# Patient Record
Sex: FEMALE | Employment: OTHER | URBAN - METROPOLITAN AREA
[De-identification: names, ages, dates, MRNs, and addresses within clinical notes are randomized per-mention and may not be internally consistent; named-entity substitution may affect disease eponyms.]

---

## 2017-02-03 ENCOUNTER — OFFICE VISIT (OUTPATIENT)
Dept: SURGERY | Age: 67
End: 2017-02-03

## 2017-02-03 VITALS
HEIGHT: 64 IN | WEIGHT: 172.5 LBS | HEART RATE: 87 BPM | DIASTOLIC BLOOD PRESSURE: 95 MMHG | OXYGEN SATURATION: 97 % | SYSTOLIC BLOOD PRESSURE: 142 MMHG | RESPIRATION RATE: 20 BRPM | BODY MASS INDEX: 29.45 KG/M2

## 2017-02-03 DIAGNOSIS — R22.31 AXILLARY MASS, RIGHT: Primary | ICD-10-CM

## 2017-02-03 DIAGNOSIS — N63.10 BREAST MASS, RIGHT: ICD-10-CM

## 2017-02-03 NOTE — PROGRESS NOTES
HISTORY OF PRESENT ILLNESS  Diana Spence is a 77 y.o. female who comes in for consultation by Dr Deana Sanchez for an axillary mass  HPI  She has been having increasing right axillary swelling and discomfort for several months. She has had shoulder injections without relief. 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 She had a mammogram in Aug 2016 which was ok. She has had bilateral breast biopsies in the past.  She has some chronic tenderness in both breasts. She denies trauma, skin changes, nipple changes or drainage, unexplained weight loss, fever, chills, or sweats or fatigue. She had menarche at 15, only pregnancy at 16, menopause at 48 and did not take HRT. Her mother had ovarian cancer at 67 ( quickly) and her niece had breast cancer at 22. Past Medical History   Diagnosis Date    Benign cyst of right breast     Hyperlipaemia     Sleep apnea      Past Surgical History   Procedure Laterality Date    Pr destruction of tumor, breast      Pr exc tumor soft tissue neck/ant thorax subfascial <5cm       Family History   Problem Relation Age of Onset    Cancer Mother      ovarian    Hypertension Mother     Cancer Other      breast     Social History   Substance Use Topics    Smoking status: Never Smoker    Smokeless tobacco: None    Alcohol use No     Current Outpatient Prescriptions   Medication Sig    cetirizine (ZYRTEC) 10 mg tablet Take  by mouth as needed for Allergies.  simvastatin (ZOCOR) 40 mg tablet Take  by mouth nightly. No current facility-administered medications for this visit. Allergies   Allergen Reactions    Pcn [Penicillins] Hives       Review of Systems   Constitutional: Negative for chills, diaphoresis, fever, malaise/fatigue and weight loss. HENT: Negative for congestion, ear pain and sore throat. Eyes: Negative for blurred vision and pain.    Respiratory: Negative for cough, hemoptysis, sputum production, shortness of breath, wheezing and stridor. Cardiovascular: Negative for chest pain, palpitations, orthopnea, claudication, leg swelling and PND. Gastrointestinal: Negative for abdominal pain, blood in stool, constipation, diarrhea, heartburn, melena, nausea and vomiting. Genitourinary: Negative for dysuria, flank pain, frequency, hematuria and urgency. Musculoskeletal: Positive for myalgias. Negative for back pain, joint pain and neck pain. Skin: Negative for itching and rash. Neurological: Negative for dizziness, tremors, focal weakness, seizures, weakness and headaches. Endo/Heme/Allergies: Negative for polydipsia. Psychiatric/Behavioral: Negative for depression and memory loss. The patient is not nervous/anxious. Visit Vitals    BP (!) 142/95 (BP 1 Location: Left arm, BP Patient Position: Sitting)    Pulse 87    Resp 20    Ht 5' 4\" (1.626 m)    Wt 78.2 kg (172 lb 8 oz)    SpO2 97%    BMI 29.61 kg/m2       Physical Exam   Constitutional: She is oriented to person, place, and time. She appears well-developed and well-nourished. No distress. HENT:   Head: Normocephalic and atraumatic. Mouth/Throat: Oropharynx is clear and moist. No oropharyngeal exudate. Eyes: Conjunctivae and EOM are normal. Pupils are equal, round, and reactive to light. No scleral icterus. Neck: Normal range of motion. Neck supple. No JVD present. No tracheal deviation present. No thyromegaly present. Cardiovascular: Normal rate and regular rhythm. Exam reveals no gallop and no friction rub. No murmur heard. Pulmonary/Chest: Effort normal and breath sounds normal. No respiratory distress. She has no wheezes. She has no rales. Right breast exhibits mass (larrge 8+ cm axillary tail fullness, soft not gritty) and tenderness. Right breast exhibits no inverted nipple and no skin change. Left breast exhibits tenderness. Left breast exhibits no inverted nipple, no mass and no skin change.  Breasts are asymmetrical (large ptotic). Abdominal: Soft. Bowel sounds are normal. She exhibits no distension and no mass. There is no tenderness. There is no rebound and no guarding. Musculoskeletal: Normal range of motion. She exhibits no edema. Lymphadenopathy:     She has no cervical adenopathy. She has no axillary adenopathy. Right: No supraclavicular adenopathy present. Left: No supraclavicular adenopathy present. Neurological: She is alert and oriented to person, place, and time. No cranial nerve deficit. Skin: Skin is warm and dry. No rash noted. She is not diaphoretic. No erythema. No pallor. Psychiatric: She has a normal mood and affect. Her behavior is normal. Judgment and thought content normal.       ASSESSMENT and PLAN  1. Right axillary tail mass ? lipoma. Does not feel like typical malignancy and is not well defined but there is certainly some asymmetry compared to the left. I explained to her about the anatomy and pathophysiology of the breast masses and risks for malignancy but likely this is benign. Will start with radiological work up  Right dx mammogram  Right breast US  Call with mammo/US results.     If negative, would proceed with breast MRI and MyRisk testing due to family hx (niece breast ca at 22, mother ovarian cancer     Miles Rodriguez MD FACS

## 2017-02-03 NOTE — MR AVS SNAPSHOT
Visit Information Date & Time Provider Department Dept. Phone Encounter #  
 2/3/2017 12:20 PM Cornelius Pollard MD [facility] [place] [id number] Upcoming Health Maintenance Date Due Hepatitis C Screening 1950 DTaP/Tdap/Td series (1 - Tdap) 6/30/1971 FOBT Q 1 YEAR AGE 50-75 6/30/2000 ZOSTER VACCINE AGE 60> 6/30/2010 GLAUCOMA SCREENING Q2Y 6/30/2015 OSTEOPOROSIS SCREENING (DEXA) 6/30/2015 Pneumococcal 65+ Low/Medium Risk (1 of 2 - PCV13) 6/30/2015 MEDICARE YEARLY EXAM 6/30/2015 INFLUENZA AGE 9 TO ADULT 8/1/2016 BREAST CANCER SCRN MAMMOGRAM 8/8/2018 Allergies as of 2/3/2017  Review Complete On: 2/3/2017 By: Cornelius Pollard MD  
  
 Severity Noted Reaction Type Reactions Pcn [Penicillins]  04/18/2010   Side Effect Hives Current Immunizations  Never Reviewed No immunizations on file. Not reviewed this visit You Were Diagnosed With   
  
 Codes Comments Axillary mass, right    -  Primary ICD-10-CM: R22.31 
ICD-9-CM: 782.2 Breast mass, right     ICD-10-CM: N63 
ICD-9-CM: 611.72 Vitals BP Pulse Resp Height(growth percentile) Weight(growth percentile) SpO2  
 (!) 142/95 (BP 1 Location: Left arm, BP Patient Position: Sitting) 87 20 5' 4\" (1.626 m) 172 lb 8 oz (78.2 kg) 97% BMI OB Status Smoking Status 29.61 kg/m2 Postmenopausal Never Smoker BMI and BSA Data Body Mass Index Body Surface Area  
 29.61 kg/m 2 1.88 m 2 Your Updated Medication List  
  
   
This list is accurate as of: 2/3/17  4:06 PM.  Always use your most recent med list.  
  
  
  
  
 cetirizine 10 mg tablet Commonly known as:  ZYRTEC Take  by mouth as needed for Allergies. simvastatin 40 mg tablet Commonly known as:  ZOCOR Take  by mouth nightly. To-Do List   
 02/03/2017 Imaging:  [facility] 3D EBONIE W MAMMO RT DX INCL CAD   
  
 02/03/2017 Imaging:  US BREAST RT LIMITED=<3 QUAD   
  
 02/10/2017 8:30 AM  
  Appointment with [facility] 2 at [address] [place] [address] ([phone]) Shower or bathe using soap and water. Do not use deodorant, powder, perfumes, or lotion the day of your exam.  If your prior mammograms were not performed at [facility] please bring films with you or forward prior images 2 days before your procedure. Check in at registration 15min before your appointment time unless you were instructed to do otherwise. A script is not necessary for screening. If you have one, please bring it on the day of the mammogram or have it faxed to the department. [facility]  [phone] [facility] [phone] [facility] [phone] [facility] [phone]  
  
 02/10/2017 9:00 AM  
  Appointment with Ritu Tam 1 at [facility] Ultrasound ([phone]) Shower or bathe using soap and water. Do not use deodorant, powder, perfumes, or lotion. If your prior films were not performed at a local [facility] facility please bring or forward prior images 2 days before procedure. Introducing \[facility] [place] [facility]\"" & HEALTH SERVICES! [facility] introduces [employer] patient portal. Now you can access parts of your medical record, email your doctor's office, and request medication refills online. 1. In your internet browser, go to [email or web address] 2. Click on the First Time User? Click Here link in the Sign In box. You will see the New Member Sign Up page. 3. Enter your [employer] Access Code exactly as it appears below. You will not need to use this code after youve completed the sign-up process. If you do not sign up before the expiration date, you must request a new code. · [employer] Access Code: [email or web address] Expires: 5/4/2017 12:06 PM 
 
4. Enter the last four digits of your Social Security Number (xxxx) and Date of Birth (mm/dd/yyyy) as indicated and click Submit. You will be taken to the next sign-up page. 5. Create a [employer] ID. This will be your [employer] login ID and cannot be changed, so think of one that is secure and easy to remember. 6. Create a [employer] password. You can change your password at any time. 7. Enter your Password Reset Question and Answer. This can be used at a later time if you forget your password. 8. Enter your e-mail address. You will receive e-mail notification when new information is available in [address] 9. Click Sign Up. You can now view and download portions of your medical record. 10. Click the Download Summary menu link to download a portable copy of your medical information. If you have questions, please visit the Frequently Asked Questions section of the [employer] website. Remember, [employer] is NOT to be used for urgent needs. For medical emergencies, dial 911. Now available from your iPhone and Android! Please provide this summary of care documentation to your next provider. Your primary care clinician is listed as Jaylen Vallejo. If you have any questions after today's visit, please call [phone].

## 2017-02-10 ENCOUNTER — HOSPITAL ENCOUNTER (OUTPATIENT)
Dept: MAMMOGRAPHY | Age: 67
Discharge: HOME | End: 2017-02-10
Attending: SURGERY
Payer: MEDICARE

## 2017-02-10 ENCOUNTER — HOSPITAL ENCOUNTER (OUTPATIENT)
Dept: ULTRASOUND IMAGING | Age: 67
Discharge: HOME | End: 2017-02-10
Attending: SURGERY
Payer: MEDICARE

## 2017-02-10 DIAGNOSIS — R22.31 AXILLARY MASS, RIGHT: ICD-10-CM

## 2017-02-10 DIAGNOSIS — N63.10 BREAST MASS, RIGHT: ICD-10-CM

## 2017-02-10 PROCEDURE — 76642 ULTRASOUND BREAST LIMITED: CPT

## 2017-02-10 PROCEDURE — 77061 BREAST TOMOSYNTHESIS UNI: CPT
